# Patient Record
(demographics unavailable — no encounter records)

---

## 2024-10-24 NOTE — DISCUSSION/SUMMARY
[de-identified] : Left knee pain follow-up  HPI Patient is a 50-year-old Mohawk speaking male who reports to office for subsequent evaluation of his left knee pain.  He had an MRI done and like to go over the results.  Cortisone injection from previous visit has given him some relief.  Certain range of motion and palpating certain areas aggravate the patient's pain at times.  Admits the knee might buckle/bilaterally occasionally.  Denies any numbness or tingling.  Left knee exam is as follows: Minimal effusion noted.  No erythema or ecchymosis.  Able to perform active straight leg raise.  Knee flexion from 0 to 120 degrees.  Medial joint line tenderness to palpation.  Calf soft and nontender.  Positive Bartolo's.  Light touch intact throughout.  Nonantalgic gait.  Left knee MRI from 9/16/2024 reviewed with the patient in detail.  It revealed the following: - Nondisplaced undersurface notch tear in the body of the medial meniscus extends as degenerative undersurface fraying into the posterior horn. - Mild medial patellofemoral chondromalacia.  Trace joint effusion and semimembranosus bursitis with semimembranosus tendinosis. - Mild proximal ACL sprain and thickening without tear.  Proximal patella tendinosis.  Assessment/plan Explained MRI results in detail.  We will continue with conservative management.  OTC NSAIDs as needed for pain.  The knee conditioning program from the [facility] was given to the patient so they may try that at home.  Follow-up in 4 to 6 weeks.  Patient may consider knee arthroscopy surgery if not improved with conservative management.  All questions/concerns were answered in detail.

## 2025-01-09 NOTE — DISCUSSION/SUMMARY
[de-identified] : Left knee pain  HPI Patient is a 50-year-old Armenian speaking male who reports to the office for subsequent reevaluation of his left knee pain.  He had a cortisone injection done in September 2024 which gave him relief.  He is interested in having another one done today.  Walking, range of motion, up/down stairs, getting up from seated position all aggravate the patient's pain. Denies any numbness or tingling. Admits the knee buckle/gives out on him occasionally.  Left knee exam is as follows: Mild effusion noted. No erythema or ecchymosis. Able to perform active straight leg raise. Knee flexion from 0 to 120 degrees with stiffness and pain. Patellofemoral, medial/lateral joint line tenderness to palpation. Calf soft nontender. Positive Bartolo's. Light touch intact throughout. Mild antalgic gait.  Previous left knee MRI revealed a medial meniscal tear and chondromalacia.  Assessment/plan We will continue to treat his meniscal tear conservatively. OTC Tylenol as needed for pain. The patient was advised to rest/ice the area and may alternate with warm compresses as needed. The knee conditioning program from the [facility] was given to the patient so they may try that at home.  With the patient's approval, and under sterile technique, I performed a steroid injection today. See the attached procedure note for further details. The patient was informed that their next cortisone injection could not be until a minimum of three months from today's date and the patient understands. Explained to the patient that the full effect of the injection will take 3-5 days to kick in.  Patient is a controlled diabetic, and I advised him to monitor his glucose over the next week. He understands that if his glucose becomes too elevated to contact his PCP for possible medication adjustment. He understands will comply.  Follow-up in 3 months.  All questions/concerns were answered in detail.  Procedure Large joint injection was performed of the left knee. The indication for this procedure was pain. The site was prepped with alcohol, ethyl chloride sprayed topically and sterile technique used. An injection of Dexamethasone 20 mg/lidocaine was used.  Patient tolerated procedure well. Patient was advised to call if redness, pain or fever occur and apply ice for 15 minutes out of every hour for the next 12-24 hours as tolerated.    The risks benefits, and alternatives have been discussed, and verbal consent was obtained.

## 2025-04-10 NOTE — DISCUSSION/SUMMARY
[de-identified] : Left knee pain  HPI Patient is a 50-year-old Kyrgyz speaking male who reports to the office for subsequent reevaluation of his left knee pain. He had a cortisone injection done in January 2025 which gave him some relief.  Pain has been acting up as of late.  He is interested in having another one done today. Walking, range of motion, up/down stairs, getting up from seated position all aggravate the patient's pain. Denies any numbness or tingling. Admits the knee buckle/gives out on him occasionally.  Left knee exam is as follows: Mild effusion noted. No erythema or ecchymosis. Able to perform active straight leg raise. Knee flexion from 0 to 120 degrees with stiffness and pain. Patellofemoral, medial/lateral joint line tenderness to palpation. Calf soft nontender. Positive Bartolo's. Light touch intact throughout. Mild antalgic gait.  Previous left knee MRI revealed a medial meniscal tear and chondromalacia.  Assessment/plan We will continue to treat his meniscal tear conservatively. OTC Tylenol as needed for pain. The patient was advised to rest/ice the area and may alternate with warm compresses as needed.  He may continue the knee conditioning exercises at home.  With the patient's approval, and under sterile technique, I performed a steroid injection today. See the attached procedure note for further details. The patient was informed that their next cortisone injection could not be until a minimum of three months from today's date and the patient understands. Explained to the patient that the full effect of the injection will take 3-5 days to kick in.  Patient is a controlled diabetic, and I advised him to monitor his glucose over the next week. He understands that if his glucose becomes too elevated to contact his PCP for possible medication adjustment. He understands will comply.  Patient may benefit from viscosupplementation injections.  Left knee Synvisc 1 gel injection ordered for the patient so he may receive that at his next visit.  Follow-up in 6 weeks.  Patient may consider knee arthroscopy surgery if he fails conservative management.  All questions/concerns were answered in detail.   Procedure Large joint injection was performed of the left knee. The indication for this procedure was pain. The site was prepped with alcohol, ethyl chloride sprayed topically and sterile technique used. An injection of Dexamethasone 10 mg/lidocaine was used.  Patient tolerated procedure well. Patient was advised to call if redness, pain or fever occur and apply ice for 15 minutes out of every hour for the next 12-24 hours as tolerated.  The risks benefits, and alternatives have been discussed, and verbal consent was obtained.

## 2025-04-23 NOTE — PHYSICAL EXAM
[de-identified] : L spine   No rashes, erythema, edema noted TTP right lumbar paraspinals and sciatic notch Positive straight leg raise on the right LLE: 5/5 strength RLE: 5/5 strength Sensation intact b/l LE

## 2025-04-23 NOTE — DISCUSSION/SUMMARY
[de-identified] : A discussion regarding available pain management treatment options occurred with the patient. These included interventional, rehabilitative, pharmacological, and alternative modalities. We will proceed with the following:  Imagin. MRI lumbar spine w/o contrast to evaluate for anatomic changes of the lumbar discs, nerves, and surrounding tissue that will help provide information to accurately diagnose the patient's cause of pain and therefore treat said pain generator in the most effective way possible - whether that be specific physical therapy recommendations, medications, and/or interventional therapies.   Testin. We are obtaining an EMG of the lower extremities to assess the health of muscles and the nerves that control them   Complementary treatment options: - lifestyle modifications discussed - avoid bending and bend with knees - avoid heavy lifting   - Follow up in 2-3 weeks to discuss imaging.   I Vania Diaz attest that this documentation has been prepared under the direction and in the presence of provider Dr. Mynor Duggan.  The documentation recorded by the scribe in my presence, accurately reflects the service I personally performed, and the decisions made by me with my edits as appropriate.   Mynor Duggan, DO

## 2025-04-23 NOTE — HISTORY OF PRESENT ILLNESS
[FreeTextEntry1] : Mr. Bell is a 50-year-old male presenting to establish care for his leg and lower back pain. He is Hungarian speaking and is using his son as a . His son is translating over the phone during the visit. The pain has been ongoing for the last couple of months. Over the last 3 months, the pain has been worsening. His pain is specifically worse when he sits down in his care and drives. He really struggles to get out of the car due to the pain. He describes the pain as sharp, shooting, throbbing and aching. He has been undergoing physical therapy for about 4 weeks with no change or improvement in pain. He rates the pain at an 8/10 on the pain scale. The pain is present daily and limits his ADLs. He denies any bowel/bladder incontinence, fevers/chills, recent weight loss or any saddle anesthesia.

## 2025-04-23 NOTE — PHYSICAL EXAM
[de-identified] : L spine   No rashes, erythema, edema noted TTP right lumbar paraspinals and sciatic notch Positive straight leg raise on the right LLE: 5/5 strength RLE: 5/5 strength Sensation intact b/l LE

## 2025-04-23 NOTE — DISCUSSION/SUMMARY
[de-identified] : A discussion regarding available pain management treatment options occurred with the patient. These included interventional, rehabilitative, pharmacological, and alternative modalities. We will proceed with the following:  Imagin. MRI lumbar spine w/o contrast to evaluate for anatomic changes of the lumbar discs, nerves, and surrounding tissue that will help provide information to accurately diagnose the patient's cause of pain and therefore treat said pain generator in the most effective way possible - whether that be specific physical therapy recommendations, medications, and/or interventional therapies.   Testin. We are obtaining an EMG of the lower extremities to assess the health of muscles and the nerves that control them   Complementary treatment options: - lifestyle modifications discussed - avoid bending and bend with knees - avoid heavy lifting   - Follow up in 2-3 weeks to discuss imaging.   I Vania Diaz attest that this documentation has been prepared under the direction and in the presence of provider Dr. Mynor Duggan.  The documentation recorded by the scribe in my presence, accurately reflects the service I personally performed, and the decisions made by me with my edits as appropriate.   Mynor Duggan, DO

## 2025-04-23 NOTE — HISTORY OF PRESENT ILLNESS
[FreeTextEntry1] : Mr. Bell is a 50-year-old male presenting to establish care for his leg and lower back pain. He is Welsh speaking and is using his son as a . His son is translating over the phone during the visit. The pain has been ongoing for the last couple of months. Over the last 3 months, the pain has been worsening. His pain is specifically worse when he sits down in his care and drives. He really struggles to get out of the car due to the pain. He describes the pain as sharp, shooting, throbbing and aching. He has been undergoing physical therapy for about 4 weeks with no change or improvement in pain. He rates the pain at an 8/10 on the pain scale. The pain is present daily and limits his ADLs. He denies any bowel/bladder incontinence, fevers/chills, recent weight loss or any saddle anesthesia.

## 2025-07-17 NOTE — DISCUSSION/SUMMARY
[de-identified] : Left knee pain  HPI Patient is a 50-year-old Upper sorbian speaking male accompanied by his son who reports to the office for subsequent reevaluation of his left knee pain. He had a cortisone injection done in April 2025 which gave him some relief. Pain has been acting up as of late. He is interested in having another injection done today. Walking, range of motion, up/down stairs, getting up from seated position all aggravate the patient's pain. Denies any numbness or tingling. Admits the knee buckle/gives out on him occasionally.  Left knee exam is as follows: Mild effusion noted. No erythema or ecchymosis. Able to perform active straight leg raise. Knee flexion from 0 to 120 degrees with stiffness and pain. Patellofemoral, medial/lateral joint line tenderness to palpation. Calf soft nontender. Positive Bartolo's. Light touch intact throughout. Mild antalgic gait.  Previous left knee MRI revealed a medial meniscal tear and chondromalacia.  Assessment/plan We will continue to treat his meniscal tear conservatively. OTC Tylenol as needed for pain. The patient was advised to rest/ice the area and may alternate with warm compresses as needed. He may continue the knee conditioning exercises at home.  With the patient's approval, and under sterile technique, I performed a steroid injection today. See the attached procedure note for further details. The patient was informed that their next cortisone injection could not be until a minimum of three months from today's date and the patient understands. Explained to the patient that the full effect of the injection will take 3-5 days to kick in.  Patient is a controlled diabetic, and I advised him to monitor his glucose over the next week. He understands that if his glucose becomes too elevated to contact his PCP for possible medication adjustment. He understands will comply.  Viscosupplementation injection was denied by patient's insurance.  Follow-up in 4 months.  All questions/concerns were answered in detail.   Procedure Large joint injection was performed of the left knee. The indication for this procedure was pain. The site was prepped with alcohol, ethyl chloride sprayed topically and sterile technique used. An injection of Dexamethasone 10 mg/lidocaine was used.  Patient tolerated procedure well. Patient was advised to call if redness, pain or fever occur and apply ice for 15 minutes out of every hour for the next 12-24 hours as tolerated.  The risks benefits, and alternatives have been discussed, and verbal consent was obtained.